# Patient Record
Sex: FEMALE | Race: WHITE | NOT HISPANIC OR LATINO | Employment: UNEMPLOYED | URBAN - METROPOLITAN AREA
[De-identification: names, ages, dates, MRNs, and addresses within clinical notes are randomized per-mention and may not be internally consistent; named-entity substitution may affect disease eponyms.]

---

## 2019-03-26 ENCOUNTER — OFFICE VISIT (OUTPATIENT)
Dept: NEUROSURGERY | Facility: CLINIC | Age: 59
End: 2019-03-26

## 2019-03-26 VITALS
HEIGHT: 65 IN | BODY MASS INDEX: 23.66 KG/M2 | TEMPERATURE: 97.9 F | WEIGHT: 142 LBS | SYSTOLIC BLOOD PRESSURE: 138 MMHG | DIASTOLIC BLOOD PRESSURE: 68 MMHG

## 2019-03-26 DIAGNOSIS — M51.36 DEGENERATIVE DISC DISEASE, LUMBAR: Primary | ICD-10-CM

## 2019-03-26 PROCEDURE — 99243 OFF/OP CNSLTJ NEW/EST LOW 30: CPT | Performed by: NEUROLOGICAL SURGERY

## 2019-03-26 RX ORDER — CYCLOBENZAPRINE HCL 10 MG
10 TABLET ORAL 3 TIMES DAILY PRN
Qty: 60 TABLET | Refills: 1 | Status: SHIPPED | OUTPATIENT
Start: 2019-03-26

## 2019-03-26 RX ORDER — OMEPRAZOLE 40 MG/1
CAPSULE, DELAYED RELEASE ORAL
Refills: 0 | COMMUNITY
Start: 2019-03-14

## 2019-03-26 RX ORDER — HYDROCHLOROTHIAZIDE 25 MG/1
25 TABLET ORAL DAILY
Refills: 0 | COMMUNITY
Start: 2019-02-19

## 2019-03-26 RX ORDER — BACLOFEN 10 MG/1
TABLET ORAL
Refills: 0 | COMMUNITY
Start: 2019-03-22 | End: 2019-03-26

## 2019-03-26 RX ORDER — HYDROCODONE BITARTRATE AND ACETAMINOPHEN 7.5; 325 MG/1; MG/1
TABLET ORAL
Refills: 0 | COMMUNITY
Start: 2019-03-21

## 2019-03-26 RX ORDER — TRAZODONE HYDROCHLORIDE 50 MG/1
50 TABLET ORAL NIGHTLY PRN
Refills: 0 | COMMUNITY
Start: 2019-03-14

## 2019-03-26 RX ORDER — PRAVASTATIN SODIUM 20 MG
20 TABLET ORAL
Refills: 0 | COMMUNITY
Start: 2019-02-19

## 2019-03-26 RX ORDER — NABUMETONE 750 MG/1
750 TABLET, FILM COATED ORAL 2 TIMES DAILY
Qty: 60 TABLET | Refills: 0 | Status: SHIPPED | OUTPATIENT
Start: 2019-03-26 | End: 2019-05-09 | Stop reason: SDUPTHER

## 2019-03-26 RX ORDER — GABAPENTIN 600 MG/1
TABLET ORAL
Refills: 0 | COMMUNITY
Start: 2019-03-21

## 2019-03-26 RX ORDER — LORATADINE 10 MG/1
TABLET ORAL
Refills: 0 | COMMUNITY
Start: 2019-03-12

## 2019-03-26 RX ORDER — DULOXETIN HYDROCHLORIDE 60 MG/1
CAPSULE, DELAYED RELEASE ORAL
Refills: 0 | COMMUNITY
Start: 2019-03-14

## 2019-03-26 RX ORDER — POTASSIUM CHLORIDE 600 MG/1
8 TABLET, FILM COATED, EXTENDED RELEASE ORAL EVERY OTHER DAY
Refills: 0 | COMMUNITY
Start: 2019-03-14

## 2019-03-26 RX ORDER — ALBUTEROL SULFATE 90 UG/1
AEROSOL, METERED RESPIRATORY (INHALATION)
Refills: 0 | COMMUNITY
Start: 2019-03-17

## 2019-03-26 RX ORDER — IBUPROFEN 800 MG/1
800 TABLET ORAL 2 TIMES DAILY WITH MEALS
Refills: 0 | COMMUNITY
Start: 2019-02-19 | End: 2019-03-26

## 2019-03-26 NOTE — PATIENT INSTRUCTIONS
Call Dr. Rivero on a Monday or Tuesday with an update.    Ask for Ivania () and leave a message for  Dr. Rivero.   He will call you back at the end of the day as soon as he can.     [phone]

## 2019-03-26 NOTE — PROGRESS NOTES
Charlee Lo  1960  [id number]      Chief Complaint   Patient presents with   • Back Pain   • Numbness     Left leg       HISTORY OF PRESENT ILLNESS: The 8-year-old female seen with a chief complaint of pain in her back which radiates into both lower extremities.  The distribution is somewhat ill-defined.  It is on the anterior lateral aspect of her thigh.  She does not have claudication however.  The symptoms are worse with twisting and bending.  She has been to physical therapy which has helped only marginally.  Lumbar MRI was performed and she is referred for neurosurgical consultation.  She has no bowel bladder dysfunction.    Past Medical History:   Diagnosis Date   • Arthritis    • Hearing loss    • Lumbosacral disc disease        Past Surgical History:   Procedure Laterality Date   • BLADDER REPAIR W/  SECTION     • BREAST LUMPECTOMY     • LUNG REMOVAL, PARTIAL      Partial lower removal on left side       Family History   Problem Relation Age of Onset   • Heart disease Mother    • Diabetes Father    • Heart disease Father        Social History     Socioeconomic History   • Marital status:      Spouse name: Not on file   • Number of children: Not on file   • Years of education: Not on file   • Highest education level: Not on file   Tobacco Use   • Smoking status: Current Every Day Smoker     Types: Cigarettes   • Smokeless tobacco: Never Used   Substance and Sexual Activity   • Alcohol use: Yes   • Drug use: No   • Sexual activity: Defer       No Known Allergies      Current Outpatient Medications:   •  baclofen (LIORESAL) 10 MG tablet, , Disp: , Rfl: 0  •  DULoxetine (CYMBALTA) 60 MG capsule, , Disp: , Rfl: 0  •  gabapentin (NEURONTIN) 600 MG tablet, , Disp: , Rfl: 0  •  hydrochlorothiazide (HYDRODIURIL) 25 MG tablet, Take 25 mg by mouth Daily., Disp: , Rfl: 0  •  HYDROcodone-acetaminophen (NORCO) 7.5-325 MG per tablet, , Disp: , Rfl: 0  •  ibuprofen (ADVIL,MOTRIN) 800 MG tablet, Take  800 mg by mouth 2 (Two) Times a Day With Meals., Disp: , Rfl: 0  •  loratadine (CLARITIN) 10 MG tablet, , Disp: , Rfl: 0  •  omeprazole (priLOSEC) 40 MG capsule, , Disp: , Rfl: 0  •  potassium chloride (KLOR-CON) 8 MEQ CR tablet, Take 8 mEq by mouth Every Other Day., Disp: , Rfl: 0  •  pravastatin (PRAVACHOL) 20 MG tablet, Take 20 mg by mouth every night at bedtime., Disp: , Rfl: 0  •  traZODone (DESYREL) 50 MG tablet, Take 50 mg by mouth At Night As Needed., Disp: , Rfl: 0  •  VENTOLIN  (90 Base) MCG/ACT inhaler, , Disp: , Rfl: 0    Review of Systems   Constitutional: Negative for activity change, appetite change, chills, diaphoresis, fatigue, fever and unexpected weight change.   HENT: Negative for congestion, dental problem, drooling, ear discharge, ear pain, facial swelling, hearing loss, mouth sores, nosebleeds, postnasal drip, rhinorrhea, sinus pressure, sneezing, sore throat, tinnitus, trouble swallowing and voice change.    Eyes: Negative for photophobia, pain, discharge, redness, itching and visual disturbance.   Respiratory: Negative for apnea, cough, choking, chest tightness, shortness of breath, wheezing and stridor.    Cardiovascular: Negative for chest pain, palpitations and leg swelling.   Gastrointestinal: Negative for abdominal distention, abdominal pain, anal bleeding, blood in stool, constipation, diarrhea, nausea, rectal pain and vomiting.   Endocrine: Negative for cold intolerance, heat intolerance, polydipsia, polyphagia and polyuria.   Genitourinary: Negative for decreased urine volume, difficulty urinating, dysuria, enuresis, flank pain, frequency, genital sores, hematuria and urgency.   Musculoskeletal: Positive for arthralgias, back pain and myalgias. Negative for gait problem, joint swelling, neck pain and neck stiffness.   Skin: Negative for color change, pallor, rash and wound.   Allergic/Immunologic: Negative for environmental allergies, food allergies and immunocompromised  "state.   Neurological: Negative for dizziness, tremors, seizures, syncope, facial asymmetry, speech difficulty, weakness, light-headedness, numbness and headaches.   Hematological: Negative for adenopathy. Does not bruise/bleed easily.   Psychiatric/Behavioral: Negative for agitation, behavioral problems, confusion, decreased concentration, dysphoric mood, hallucinations, self-injury, sleep disturbance and suicidal ideas. The patient is not nervous/anxious and is not hyperactive.        Vitals:    03/26/19 1412   BP: 138/68   BP Location: Left arm   Patient Position: Sitting   Temp: 97.9 °F (36.6 °C)   TempSrc: Temporal   Weight: 64.4 kg (142 lb)   Height: 165.1 cm (65\")       Neurological Examination:    Mental status/speech: The patient is alert and oriented.  Speech is clear without aphysia or dysarthria.  No overt cognitive deficits.    Cranial nerve examination:    Olfaction: Smell is intact.  Vision: Vision is intact without visual field abnormalities.  Funduscopic examination is normal.  No pupillary irregularity.  Ocular motor examination: The extraocular muscles are intact.  There is no diplopia.  The pupil is round and reactive to both light and accommodation.  There is no nystagmus.  Facial movement/sensation: There is no facial weakness.  Sensation is intact in the first, second, and third divisions of the trigeminal nerve.  The corneal reflex is intact.  Auditory: Hearing is intact to finger rub bilaterally.  Cranial nerves IX, X, XI, XII: Phonation is normal.  No dysphagia.  Tongue is protruded in the midline without atrophy.  The gag reflex is intact.  Shoulder shrug is normal.    Musculoligamentous ligamentous examination: Limitation of range of motion lumbar spine including flexion extension lateral rotation lateral bending.  Straight leg raising , Lasègue and flip test including Nelsy test are negative.  I am unable to find weakness, sensory loss or reflex asymmetry.    Medical Decision " Making:     Diagnostic Data Set: The lumbar MRI data sets show the presence of degenerative disc disease throughout the lumbar spine.  It is quite severe at L5-S1 where she has marked disc space narrowing and facet hypertrophy.  She has a protrusion of the intervertebral disc at L4-5.  There is mild to moderate neuroforaminal stenosis.  There is no evidence of instability.      Assessment: Degenerative osteoarthritis of the lumbar spine.          Recommendations: I have suggested a TENS unit.  I have also sent her to pain management in [place] for epidural steroid injection.  I have given her prescription of Relafen 750 mg twice daily and extra L 10 mg 3 times daily.  She will call me after she has her epidural steroid injection.  Surgery is not a consideration.        I greatly appreciate the opportunity to see and evaluate this individual.  If you have questions or concerns regarding issues that I may have overlooked please call me at any time: [phone].  Shawn Rivero M.D.  [facility]  [address] [place] [address]    Scribed for Ronald Rivero MD by Gloria Colmenares CMA. 3/26/2019  2:34 PM    I have read and concur with the information provided by the scribe.  Ronald Rivero MD

## 2019-05-09 DIAGNOSIS — M51.36 DEGENERATIVE DISC DISEASE, LUMBAR: Primary | ICD-10-CM

## 2019-05-09 RX ORDER — NABUMETONE 750 MG/1
TABLET, FILM COATED ORAL
Qty: 60 TABLET | Refills: 0 | Status: SHIPPED | OUTPATIENT
Start: 2019-05-09 | End: 2019-06-02 | Stop reason: SDUPTHER

## 2019-05-09 NOTE — TELEPHONE ENCOUNTER
Provider:  Mat  Caller: haris  Time of call: n/a    Phone #:  n/a  Last visit: 3/26/19   Next visit: tbd      Reason for call:         Automated refill request.

## 2019-06-02 DIAGNOSIS — M51.36 DEGENERATIVE DISC DISEASE, LUMBAR: ICD-10-CM

## 2019-06-03 RX ORDER — NABUMETONE 750 MG/1
TABLET, FILM COATED ORAL
Qty: 60 TABLET | Refills: 0 | Status: SHIPPED | OUTPATIENT
Start: 2019-06-03
